# Patient Record
Sex: FEMALE | Race: WHITE | NOT HISPANIC OR LATINO | URBAN - METROPOLITAN AREA
[De-identification: names, ages, dates, MRNs, and addresses within clinical notes are randomized per-mention and may not be internally consistent; named-entity substitution may affect disease eponyms.]

---

## 2017-12-03 ENCOUNTER — EMERGENCY (EMERGENCY)
Facility: HOSPITAL | Age: 67
LOS: 1 days | Discharge: HOME | End: 2017-12-03
Attending: EMERGENCY MEDICINE | Admitting: EMERGENCY MEDICINE
Payer: MEDICARE

## 2017-12-03 VITALS
HEIGHT: 63 IN | RESPIRATION RATE: 20 BRPM | TEMPERATURE: 98 F | OXYGEN SATURATION: 99 % | DIASTOLIC BLOOD PRESSURE: 92 MMHG | SYSTOLIC BLOOD PRESSURE: 149 MMHG | HEART RATE: 90 BPM | WEIGHT: 190.04 LBS

## 2017-12-03 LAB
ALBUMIN SERPL ELPH-MCNC: 4.2 G/DL — SIGNIFICANT CHANGE UP (ref 3.3–5.2)
ALP SERPL-CCNC: 58 U/L — SIGNIFICANT CHANGE UP (ref 40–120)
ALT FLD-CCNC: 21 U/L — SIGNIFICANT CHANGE UP
ANION GAP SERPL CALC-SCNC: 18 MMOL/L — HIGH (ref 5–17)
AST SERPL-CCNC: 18 U/L — SIGNIFICANT CHANGE UP
BASOPHILS # BLD AUTO: 0 K/UL — SIGNIFICANT CHANGE UP (ref 0–0.2)
BASOPHILS NFR BLD AUTO: 0.1 % — SIGNIFICANT CHANGE UP (ref 0–2)
BILIRUB SERPL-MCNC: 0.3 MG/DL — LOW (ref 0.4–2)
BUN SERPL-MCNC: 14 MG/DL — SIGNIFICANT CHANGE UP (ref 8–20)
CALCIUM SERPL-MCNC: 9.1 MG/DL — SIGNIFICANT CHANGE UP (ref 8.6–10.2)
CHLORIDE SERPL-SCNC: 101 MMOL/L — SIGNIFICANT CHANGE UP (ref 98–107)
CO2 SERPL-SCNC: 22 MMOL/L — SIGNIFICANT CHANGE UP (ref 22–29)
CREAT SERPL-MCNC: 0.79 MG/DL — SIGNIFICANT CHANGE UP (ref 0.5–1.3)
EOSINOPHIL # BLD AUTO: 0 K/UL — SIGNIFICANT CHANGE UP (ref 0–0.5)
EOSINOPHIL NFR BLD AUTO: 0 % — SIGNIFICANT CHANGE UP (ref 0–6)
GLUCOSE SERPL-MCNC: 135 MG/DL — HIGH (ref 70–115)
HCT VFR BLD CALC: 40.7 % — SIGNIFICANT CHANGE UP (ref 37–47)
HGB BLD-MCNC: 13.7 G/DL — SIGNIFICANT CHANGE UP (ref 12–16)
LYMPHOCYTES # BLD AUTO: 0.8 K/UL — LOW (ref 1–4.8)
LYMPHOCYTES # BLD AUTO: 10.4 % — LOW (ref 20–55)
MAGNESIUM SERPL-MCNC: 2.4 MG/DL — SIGNIFICANT CHANGE UP (ref 1.6–2.6)
MCHC RBC-ENTMCNC: 29.1 PG — SIGNIFICANT CHANGE UP (ref 27–31)
MCHC RBC-ENTMCNC: 33.7 G/DL — SIGNIFICANT CHANGE UP (ref 32–36)
MCV RBC AUTO: 86.6 FL — SIGNIFICANT CHANGE UP (ref 81–99)
MONOCYTES # BLD AUTO: 0.3 K/UL — SIGNIFICANT CHANGE UP (ref 0–0.8)
MONOCYTES NFR BLD AUTO: 4.7 % — SIGNIFICANT CHANGE UP (ref 3–10)
NEUTROPHILS # BLD AUTO: 6.1 K/UL — SIGNIFICANT CHANGE UP (ref 1.8–8)
NEUTROPHILS NFR BLD AUTO: 84 % — HIGH (ref 37–73)
PLATELET # BLD AUTO: 221 K/UL — SIGNIFICANT CHANGE UP (ref 150–400)
POTASSIUM SERPL-MCNC: 4.8 MMOL/L — SIGNIFICANT CHANGE UP (ref 3.5–5.3)
POTASSIUM SERPL-SCNC: 4.8 MMOL/L — SIGNIFICANT CHANGE UP (ref 3.5–5.3)
PROT SERPL-MCNC: 7.4 G/DL — SIGNIFICANT CHANGE UP (ref 6.6–8.7)
RBC # BLD: 4.7 M/UL — SIGNIFICANT CHANGE UP (ref 4.4–5.2)
RBC # FLD: 14.1 % — SIGNIFICANT CHANGE UP (ref 11–15.6)
SODIUM SERPL-SCNC: 141 MMOL/L — SIGNIFICANT CHANGE UP (ref 135–145)
T4 AB SER-ACNC: 6 UG/DL — SIGNIFICANT CHANGE UP (ref 4.5–12)
TROPONIN T SERPL-MCNC: <0.01 NG/ML — SIGNIFICANT CHANGE UP (ref 0–0.06)
TSH SERPL-MCNC: 0.7 UIU/ML — SIGNIFICANT CHANGE UP (ref 0.27–4.2)
WBC # BLD: 7.2 K/UL — SIGNIFICANT CHANGE UP (ref 4.8–10.8)
WBC # FLD AUTO: 7.2 K/UL — SIGNIFICANT CHANGE UP (ref 4.8–10.8)

## 2017-12-03 PROCEDURE — 36415 COLL VENOUS BLD VENIPUNCTURE: CPT

## 2017-12-03 PROCEDURE — 80053 COMPREHEN METABOLIC PANEL: CPT

## 2017-12-03 PROCEDURE — 70450 CT HEAD/BRAIN W/O DYE: CPT

## 2017-12-03 PROCEDURE — 83735 ASSAY OF MAGNESIUM: CPT

## 2017-12-03 PROCEDURE — 99284 EMERGENCY DEPT VISIT MOD MDM: CPT | Mod: 25

## 2017-12-03 PROCEDURE — 99285 EMERGENCY DEPT VISIT HI MDM: CPT

## 2017-12-03 PROCEDURE — 84484 ASSAY OF TROPONIN QUANT: CPT

## 2017-12-03 PROCEDURE — 85027 COMPLETE CBC AUTOMATED: CPT

## 2017-12-03 PROCEDURE — 70486 CT MAXILLOFACIAL W/O DYE: CPT | Mod: 26

## 2017-12-03 PROCEDURE — 70450 CT HEAD/BRAIN W/O DYE: CPT | Mod: 26

## 2017-12-03 PROCEDURE — 84443 ASSAY THYROID STIM HORMONE: CPT

## 2017-12-03 PROCEDURE — 70486 CT MAXILLOFACIAL W/O DYE: CPT

## 2017-12-03 PROCEDURE — 84436 ASSAY OF TOTAL THYROXINE: CPT

## 2017-12-03 RX ORDER — MECLIZINE HCL 12.5 MG
50 TABLET ORAL ONCE
Qty: 0 | Refills: 0 | Status: DISCONTINUED | OUTPATIENT
Start: 2017-12-03 | End: 2017-12-07

## 2017-12-03 RX ORDER — DIAZEPAM 5 MG
1 TABLET ORAL
Qty: 20 | Refills: 0
Start: 2017-12-03 | End: 2017-12-08

## 2017-12-03 RX ORDER — DIAZEPAM 5 MG
5 TABLET ORAL ONCE
Qty: 0 | Refills: 0 | Status: DISCONTINUED | OUTPATIENT
Start: 2017-12-03 | End: 2017-12-03

## 2017-12-03 RX ORDER — SODIUM CHLORIDE 9 MG/ML
2000 INJECTION INTRAMUSCULAR; INTRAVENOUS; SUBCUTANEOUS ONCE
Qty: 0 | Refills: 0 | Status: COMPLETED | OUTPATIENT
Start: 2017-12-03 | End: 2017-12-03

## 2017-12-03 RX ADMIN — SODIUM CHLORIDE 2000 MILLILITER(S): 9 INJECTION INTRAMUSCULAR; INTRAVENOUS; SUBCUTANEOUS at 20:18

## 2017-12-03 RX ADMIN — Medication 5 MILLIGRAM(S): at 20:18

## 2017-12-03 NOTE — ED ADULT TRIAGE NOTE - CHIEF COMPLAINT QUOTE
Patient BIBA, A/Ox3 states she feels dizzy and shaky, symptoms started 1 hour ago, patient being treated for sinus infection, recently started prednisone.  Patient reports elevated blood pressure at home 186/115.

## 2017-12-03 NOTE — ED ADULT NURSE NOTE - OBJECTIVE STATEMENT
Assumed pt care @ 1940. Pt received sitting on stretcher in NAD. Pt AOx3 C/O having dizziness and feeling shakey. Pt reports High BP @ home but has no h/o HTN. Pt reports being on Prednisone, Flonase and Levaquin x 10 days but has sinus symptoms x 1 months. Coughing up green phlegm. Pt reports Blurred vision x 2 days in the Let eye. Pt has poor vision in the left eye and wears a contact for that eye but hasn't in 2 days due to the blurred vision and is wearing her glasses instead. Lungs CTA, RR even unlabored. Denies Fever, Chills, SOB, Chest Pain, Nausea, Vomiting, Diarrhea. Skin warm, dry, color appropriate for age and race.

## 2017-12-03 NOTE — ED PROVIDER NOTE - CARE PLAN
Principal Discharge DX:	Dizziness  Secondary Diagnosis:	Viral URI  Secondary Diagnosis:	Palpitations

## 2017-12-03 NOTE — ED PROVIDER NOTE - OBJECTIVE STATEMENT
66 y/o female presents to the ED with c/o dizziness, onset 2 weeks ago. Pt reports congestion, nasal drip, non productive cough. Pt states she was seen by Urgent Care twice, given Medrol pack. Per pt second visit to Urgent Care she was given Levaquin for sinus infection and Prednisone, pt last dose was today. Pt states she took BP at home and noted to be 180/115 at home during dizziness episode. Pt presents to the ED today for evaluation of dizziness, palpitations, described as heart racing, blurry vision, and "feeling like she is going to pass out." Pt states symptoms are worse with walking. Per pt , pt appears pale. Pt also states she was evaluated by PMD on 11/19 for palpitations. Pt denies fever, chills, SOB and any other acute symptoms and complaints at this time.   PMD: Dr. Gee

## 2017-12-03 NOTE — ED ADULT NURSE REASSESSMENT NOTE - NS ED NURSE REASSESS COMMENT FT1
Pt does not want to take Meclizine right now, Dr. Campoverde made aware. Pt will see how she does with Valium. RN will reevaluate pts dizziness.

## 2017-12-03 NOTE — ED PROVIDER NOTE - MEDICAL DECISION MAKING DETAILS
Will treat with normal saline, Valium, Meclozine, obtain labs including 1 set cardiac enzymes, TSH, head CT, ECG, and reevaluate.

## 2019-06-05 ENCOUNTER — EMERGENCY (EMERGENCY)
Facility: HOSPITAL | Age: 69
LOS: 1 days | Discharge: HOME | End: 2019-06-05
Attending: EMERGENCY MEDICINE
Payer: MEDICARE

## 2019-06-05 VITALS
SYSTOLIC BLOOD PRESSURE: 183 MMHG | HEIGHT: 63 IN | RESPIRATION RATE: 18 BRPM | HEART RATE: 75 BPM | TEMPERATURE: 98 F | OXYGEN SATURATION: 97 % | WEIGHT: 184.97 LBS | DIASTOLIC BLOOD PRESSURE: 83 MMHG

## 2019-06-05 VITALS
RESPIRATION RATE: 20 BRPM | DIASTOLIC BLOOD PRESSURE: 86 MMHG | SYSTOLIC BLOOD PRESSURE: 147 MMHG | TEMPERATURE: 98 F | HEART RATE: 70 BPM | OXYGEN SATURATION: 96 %

## 2019-06-05 DIAGNOSIS — Z96.641 PRESENCE OF RIGHT ARTIFICIAL HIP JOINT: Chronic | ICD-10-CM

## 2019-06-05 LAB
ALBUMIN SERPL ELPH-MCNC: 4.3 G/DL — SIGNIFICANT CHANGE UP (ref 3.3–5.2)
ALP SERPL-CCNC: 59 U/L — SIGNIFICANT CHANGE UP (ref 40–120)
ALT FLD-CCNC: 18 U/L — SIGNIFICANT CHANGE UP
ANION GAP SERPL CALC-SCNC: 13 MMOL/L — SIGNIFICANT CHANGE UP (ref 5–17)
AST SERPL-CCNC: 18 U/L — SIGNIFICANT CHANGE UP
BILIRUB SERPL-MCNC: 0.7 MG/DL — SIGNIFICANT CHANGE UP (ref 0.4–2)
BUN SERPL-MCNC: 9 MG/DL — SIGNIFICANT CHANGE UP (ref 8–20)
CALCIUM SERPL-MCNC: 9.6 MG/DL — SIGNIFICANT CHANGE UP (ref 8.6–10.2)
CHLORIDE SERPL-SCNC: 106 MMOL/L — SIGNIFICANT CHANGE UP (ref 98–107)
CO2 SERPL-SCNC: 25 MMOL/L — SIGNIFICANT CHANGE UP (ref 22–29)
CREAT SERPL-MCNC: 0.6 MG/DL — SIGNIFICANT CHANGE UP (ref 0.5–1.3)
GLUCOSE SERPL-MCNC: 103 MG/DL — SIGNIFICANT CHANGE UP (ref 70–115)
HCT VFR BLD CALC: 39.2 % — SIGNIFICANT CHANGE UP (ref 37–47)
HGB BLD-MCNC: 13 G/DL — SIGNIFICANT CHANGE UP (ref 12–16)
INR BLD: 1.03 RATIO — SIGNIFICANT CHANGE UP (ref 0.88–1.16)
MCHC RBC-ENTMCNC: 28.6 PG — SIGNIFICANT CHANGE UP (ref 27–31)
MCHC RBC-ENTMCNC: 33.2 G/DL — SIGNIFICANT CHANGE UP (ref 32–36)
MCV RBC AUTO: 86.2 FL — SIGNIFICANT CHANGE UP (ref 81–99)
PLATELET # BLD AUTO: 186 K/UL — SIGNIFICANT CHANGE UP (ref 150–400)
POTASSIUM SERPL-MCNC: 4.8 MMOL/L — SIGNIFICANT CHANGE UP (ref 3.5–5.3)
POTASSIUM SERPL-SCNC: 4.8 MMOL/L — SIGNIFICANT CHANGE UP (ref 3.5–5.3)
PROT SERPL-MCNC: 7.3 G/DL — SIGNIFICANT CHANGE UP (ref 6.6–8.7)
PROTHROM AB SERPL-ACNC: 11.9 SEC — SIGNIFICANT CHANGE UP (ref 10–12.9)
RBC # BLD: 4.55 M/UL — SIGNIFICANT CHANGE UP (ref 4.4–5.2)
RBC # FLD: 14.2 % — SIGNIFICANT CHANGE UP (ref 11–15.6)
SODIUM SERPL-SCNC: 144 MMOL/L — SIGNIFICANT CHANGE UP (ref 135–145)
T3 SERPL-MCNC: 115 NG/DL — SIGNIFICANT CHANGE UP (ref 80–200)
TROPONIN T SERPL-MCNC: <0.01 NG/ML — SIGNIFICANT CHANGE UP (ref 0–0.06)
TROPONIN T SERPL-MCNC: <0.01 NG/ML — SIGNIFICANT CHANGE UP (ref 0–0.06)
WBC # BLD: 5.4 K/UL — SIGNIFICANT CHANGE UP (ref 4.8–10.8)
WBC # FLD AUTO: 5.4 K/UL — SIGNIFICANT CHANGE UP (ref 4.8–10.8)

## 2019-06-05 PROCEDURE — 93005 ELECTROCARDIOGRAM TRACING: CPT

## 2019-06-05 PROCEDURE — 36415 COLL VENOUS BLD VENIPUNCTURE: CPT

## 2019-06-05 PROCEDURE — 85610 PROTHROMBIN TIME: CPT

## 2019-06-05 PROCEDURE — 71045 X-RAY EXAM CHEST 1 VIEW: CPT | Mod: 26

## 2019-06-05 PROCEDURE — 93010 ELECTROCARDIOGRAM REPORT: CPT

## 2019-06-05 PROCEDURE — 71045 X-RAY EXAM CHEST 1 VIEW: CPT

## 2019-06-05 PROCEDURE — 99284 EMERGENCY DEPT VISIT MOD MDM: CPT | Mod: 25

## 2019-06-05 PROCEDURE — 80053 COMPREHEN METABOLIC PANEL: CPT

## 2019-06-05 PROCEDURE — 84484 ASSAY OF TROPONIN QUANT: CPT

## 2019-06-05 PROCEDURE — 84480 ASSAY TRIIODOTHYRONINE (T3): CPT

## 2019-06-05 PROCEDURE — 99285 EMERGENCY DEPT VISIT HI MDM: CPT

## 2019-06-05 PROCEDURE — 84436 ASSAY OF TOTAL THYROXINE: CPT

## 2019-06-05 PROCEDURE — 85027 COMPLETE CBC AUTOMATED: CPT

## 2019-06-05 PROCEDURE — 84443 ASSAY THYROID STIM HORMONE: CPT

## 2019-06-05 RX ORDER — CEPHALEXIN 500 MG
1 CAPSULE ORAL
Qty: 40 | Refills: 0
Start: 2019-06-05 | End: 2019-06-14

## 2019-06-05 RX ORDER — ASPIRIN/CALCIUM CARB/MAGNESIUM 324 MG
324 TABLET ORAL ONCE
Refills: 0 | Status: COMPLETED | OUTPATIENT
Start: 2019-06-05 | End: 2019-06-05

## 2019-06-05 RX ORDER — TETANUS TOXOID, REDUCED DIPHTHERIA TOXOID AND ACELLULAR PERTUSSIS VACCINE, ADSORBED 5; 2.5; 8; 8; 2.5 [IU]/.5ML; [IU]/.5ML; UG/.5ML; UG/.5ML; UG/.5ML
0.5 SUSPENSION INTRAMUSCULAR ONCE
Refills: 0 | Status: COMPLETED | OUTPATIENT
Start: 2019-06-05 | End: 2019-06-05

## 2019-06-05 RX ORDER — SODIUM CHLORIDE 9 MG/ML
3 INJECTION INTRAMUSCULAR; INTRAVENOUS; SUBCUTANEOUS ONCE
Refills: 0 | Status: COMPLETED | OUTPATIENT
Start: 2019-06-05 | End: 2019-06-05

## 2019-06-05 RX ORDER — CEPHALEXIN 500 MG
500 CAPSULE ORAL ONCE
Refills: 0 | Status: COMPLETED | OUTPATIENT
Start: 2019-06-05 | End: 2019-06-05

## 2019-06-05 RX ADMIN — Medication 500 MILLIGRAM(S): at 11:59

## 2019-06-05 RX ADMIN — SODIUM CHLORIDE 3 MILLILITER(S): 9 INJECTION INTRAMUSCULAR; INTRAVENOUS; SUBCUTANEOUS at 12:07

## 2019-06-05 RX ADMIN — Medication 324 MILLIGRAM(S): at 11:58

## 2019-06-05 NOTE — ED PROVIDER NOTE - MUSCULOSKELETAL, MLM
Spine appears normal, range of motion is not limited, no muscle or joint tenderness from to hips ankles kness

## 2019-06-05 NOTE — CONSULT NOTE ADULT - SUBJECTIVE AND OBJECTIVE BOX
[facility], THE [facility]                                   [address] [place] [address]                                                      PHONE: [phone]                                                         FAX: [phone]  [email or web address]  ---------------------------------------------------------------------------------------------------------------------------------    Reason for Consult: chest pain    HPI:  BROCK ANDRADE is an 68y Female with HTN, HLD, moderate aortic regurgitation presented this morning with redness and swelling on her right thigh after bee sting last week. She was concerned because she recently had right hip replacement and was concerned that she could get a hip infection. While in the emergency room, she also developed chest pressure across her entire chest that has been intermittent. She had negative PET myocardial perfusion scan in Jan 2018.     PAST MEDICAL & SURGICAL HISTORY:  No significant past surgical history      Tapazole (Rash)  Tapazole (Unknown)  tetracycline (Rash)  tetracyclines (Unknown)      MEDICATIONS  (STANDING):  aspirin  chewable 324 milliGRAM(s) Oral once  cephalexin 500 milliGRAM(s) Oral once  diphtheria/tetanus/pertussis (acellular) Vaccine (ADAcel) 0.5 milliLiter(s) IntraMuscular once  sodium chloride 0.9% lock flush 3 milliLiter(s) IV Push once    MEDICATIONS  (PRN):      Social History:  Cigarettes: denies                  Alcohol:                 Illicit Drug Abuse:      Family History:  no known history of CAD    ROS: Negative other than as mentioned in HPI.    Vital Signs Last 24 Hrs  T(C): 36.8 (05 Jun 2019 09:30), Max: 36.8 (05 Jun 2019 09:30)  T(F): 98.3 (05 Jun 2019 09:30), Max: 98.3 (05 Jun 2019 09:30)  HR: 75 (05 Jun 2019 09:30) (75 - 75)  BP: 183/83 (05 Jun 2019 09:30) (183/83 - 183/83)  BP(mean): --  RR: 18 (05 Jun 2019 09:30) (18 - 18)  SpO2: 97% (05 Jun 2019 09:30) (97% - 97%)  ICU Vital Signs Last 24 Hrs  BROCK ANDRADE  I&O's Detail    I&O's Summary    Drug Dosing Weight  BROCK ANDRADE      PHYSICAL EXAM:  General: Appears well developed, well nourished alert and cooperative.  HEENT: Head; normocephalic, atraumatic.  Eyes: Pupils reactive, cornea wnl.  Neck: Supple, no nodes adenopathy, no NVD or carotid bruit or thyromegaly.  CARDIOVASCULAR: Normal S1 and S2, No murmur, rub, gallop or lift.   LUNGS: No rales, rhonchi or wheeze. Normal breath sounds bilaterally.  ABDOMEN: Soft, nontender without mass or organomegaly. bowel sounds normoactive.  EXTREMITIES: No clubbing, cyanosis or edema. Distal pulses wnl.   SKIN: warm and dry with normal turgor.  NEURO: Alert/oriented x 3/normal motor exam. No pathologic reflexes.    PSYCH: normal affect.      RADIOLOGY & ADDITIONAL STUDIES:    INTERPRETATION OF TELEMETRY (personally reviewed): sinus rhythm    ECG: sinus rhythm 71 bpm, normal axis, normal intervals, no ischemic ST abnormality    ECHO Jan 2018: Normal LV function, mild to moderate aortic regurgitation    Cardiac PET Jan 2018: normal res-stress myocardial perfusion    Assessment and Plan:  In summary, BROCK ANDRADE is an 68y Female with past medical history significant for HTN, HLD, moderate aortic regurgitation presented this morning with redness and swelling on her right thigh after bee sting last week and atypical chest pain while in the emergency room.    Atypical Chest Pain -- EKG is not ischemic. Cardiac PET showed normal perfusion Jan 2018. Low suspicion for ACS at this time.  - give  mg once  - check troponin and repeat EKG -- if troponin negative x 2, patient can be discharged home and I will arrange for close follow up in our office.    Thank you for allowing [facility] to participate in the care of this patient. Please do not hesitate to call with any questions or concerns.

## 2019-06-05 NOTE — ED PROVIDER NOTE - OBJECTIVE STATEMENT
69 y/o F hx of HTN right hip replacement, pw rash to right thigh since last night.. Patient states she was stung by a bee twice last week, on right thigh. The stings are on the right thigh, one on the posterior aspect and one on the anterior aspect of the thigh. notes no redness initially but became red yesterday and was very itchy.  pt notes that felt nervous at night bc of it and developed some midsternal chest pressure around 3am. non radiating no numbness/tingling no sob + mild palpitations. Denies f/c/n/v//sob/cough/rash/headache/dizziness/abd.pain/d/c/dysuria/hematuria. no sick contacs no recent travel no hx of dvt/pe

## 2019-06-05 NOTE — ED STATDOCS - PROGRESS NOTE DETAILS
At the end of patient encounter, patient requests BP recheck, because it was slightly elevated when she came in. She states it is most likely from anxiety, but is complaining of the chest pressure, 3/10, with hx of HTN. WIll now be transferred to [facility] for full evaluation by another provider.

## 2019-06-05 NOTE — ED ADULT NURSE NOTE - OBJECTIVE STATEMENT
Pt care assumed at 1137, in no apparent distress at this time. pt A&Ox3, resting in stretcher with  at bedside. pt reports being stung by a bee twice on the right thigh. Pt care assumed at 1137, in no apparent distress at this time. pt A&Ox3, resting in stretcher with  at bedside. pt reports being stung by a bee twice on the right thigh last week. Pt c/o right thigh itchiness and discomfort that woke her up during the night. Redness and swelling noted Pt care assumed at 1137, in no apparent distress at this time. Pt A&Ox3, resting in stretcher with  at bedside. Pt reports being stung by a bee twice on the right thigh last week. Pt c/o right thigh itchiness and discomfort that woke her up during the night. Redness and swelling noted on right thigh. Erythematous area outlined. Pt reports she had chest pain and palpitations at 3am this morning. Pt denies any chest pain, palpitations or shortness of breath at this time. Pt expresses concern because she had a total right hip replacement in December. HR is NSR on cardiac monitor. Pt educated on plan of care, plan of care taught back to RN. Proficiency determined from successful pt teach back. will continue to educate pt throughout ED stay.

## 2019-06-05 NOTE — ED STATDOCS - CLINICAL SUMMARY MEDICAL DECISION MAKING FREE TEXT BOX
Patient here for cellulitis, subsequently c/o chest pressure, 3/10 across her chest, with no associated symptoms. Hx of HTN. Contacted [facility]. Send to main room for labs, chest xr , full eval.   EKG: does no show acute changes.

## 2019-06-05 NOTE — ED PROVIDER NOTE - CLINICAL SUMMARY MEDICAL DECISION MAKING FREE TEXT BOX
cellulitis to le will fu labs tx with abx  cp most likely related to anxiety [facility] cardiology evaluated pt will do 2 trops ekg--dc with outpatient follow up with trops neg

## 2019-06-05 NOTE — ED STATDOCS - SKIN, MLM
skin normal color for race, warm, dry and intact. 5cm area of cellulitis, distal right lower extremity. No vesicles or drainage. Erythematous, warm, FROM of leg.

## 2019-06-09 ENCOUNTER — EMERGENCY (EMERGENCY)
Facility: HOSPITAL | Age: 69
LOS: 1 days | Discharge: HOME | End: 2019-06-09
Attending: EMERGENCY MEDICINE
Payer: MEDICARE

## 2019-06-09 VITALS
RESPIRATION RATE: 16 BRPM | OXYGEN SATURATION: 97 % | HEART RATE: 63 BPM | TEMPERATURE: 98 F | DIASTOLIC BLOOD PRESSURE: 83 MMHG | SYSTOLIC BLOOD PRESSURE: 144 MMHG

## 2019-06-09 VITALS — HEIGHT: 63 IN | WEIGHT: 184.97 LBS

## 2019-06-09 DIAGNOSIS — Z96.641 PRESENCE OF RIGHT ARTIFICIAL HIP JOINT: Chronic | ICD-10-CM

## 2019-06-09 PROBLEM — I10 ESSENTIAL (PRIMARY) HYPERTENSION: Chronic | Status: ACTIVE | Noted: 2019-06-05

## 2019-06-09 PROBLEM — E78.5 HYPERLIPIDEMIA, UNSPECIFIED: Chronic | Status: ACTIVE | Noted: 2019-06-05

## 2019-06-09 LAB
ALBUMIN SERPL ELPH-MCNC: 4.2 G/DL — SIGNIFICANT CHANGE UP (ref 3.3–5.2)
ALP SERPL-CCNC: 60 U/L — SIGNIFICANT CHANGE UP (ref 40–120)
ALT FLD-CCNC: 16 U/L — SIGNIFICANT CHANGE UP
ANION GAP SERPL CALC-SCNC: 13 MMOL/L — SIGNIFICANT CHANGE UP (ref 5–17)
AST SERPL-CCNC: 16 U/L — SIGNIFICANT CHANGE UP
BASOPHILS # BLD AUTO: 0 K/UL — SIGNIFICANT CHANGE UP (ref 0–0.2)
BASOPHILS NFR BLD AUTO: 0.3 % — SIGNIFICANT CHANGE UP (ref 0–2)
BILIRUB SERPL-MCNC: 0.7 MG/DL — SIGNIFICANT CHANGE UP (ref 0.4–2)
BUN SERPL-MCNC: 12 MG/DL — SIGNIFICANT CHANGE UP (ref 8–20)
CALCIUM SERPL-MCNC: 9.6 MG/DL — SIGNIFICANT CHANGE UP (ref 8.6–10.2)
CHLORIDE SERPL-SCNC: 102 MMOL/L — SIGNIFICANT CHANGE UP (ref 98–107)
CO2 SERPL-SCNC: 24 MMOL/L — SIGNIFICANT CHANGE UP (ref 22–29)
CREAT SERPL-MCNC: 0.82 MG/DL — SIGNIFICANT CHANGE UP (ref 0.5–1.3)
CRP SERPL-MCNC: <0.4 MG/DL — SIGNIFICANT CHANGE UP (ref 0–0.4)
EOSINOPHIL # BLD AUTO: 0.2 K/UL — SIGNIFICANT CHANGE UP (ref 0–0.5)
EOSINOPHIL NFR BLD AUTO: 3.5 % — SIGNIFICANT CHANGE UP (ref 0–6)
ERYTHROCYTE [SEDIMENTATION RATE] IN BLOOD: 26 MM/HR — HIGH (ref 0–20)
GLUCOSE SERPL-MCNC: 111 MG/DL — SIGNIFICANT CHANGE UP (ref 70–115)
HCT VFR BLD CALC: 36.6 % — LOW (ref 37–47)
HGB BLD-MCNC: 12.3 G/DL — SIGNIFICANT CHANGE UP (ref 12–16)
LACTATE BLDV-MCNC: 1.9 MMOL/L — SIGNIFICANT CHANGE UP (ref 0.5–2)
LYMPHOCYTES # BLD AUTO: 1.7 K/UL — SIGNIFICANT CHANGE UP (ref 1–4.8)
LYMPHOCYTES # BLD AUTO: 25.1 % — SIGNIFICANT CHANGE UP (ref 20–55)
MCHC RBC-ENTMCNC: 28.6 PG — SIGNIFICANT CHANGE UP (ref 27–31)
MCHC RBC-ENTMCNC: 33.6 G/DL — SIGNIFICANT CHANGE UP (ref 32–36)
MCV RBC AUTO: 85.1 FL — SIGNIFICANT CHANGE UP (ref 81–99)
MONOCYTES # BLD AUTO: 0.8 K/UL — SIGNIFICANT CHANGE UP (ref 0–0.8)
MONOCYTES NFR BLD AUTO: 11.7 % — HIGH (ref 3–10)
NEUTROPHILS # BLD AUTO: 4 K/UL — SIGNIFICANT CHANGE UP (ref 1.8–8)
NEUTROPHILS NFR BLD AUTO: 59.3 % — SIGNIFICANT CHANGE UP (ref 37–73)
PLATELET # BLD AUTO: 171 K/UL — SIGNIFICANT CHANGE UP (ref 150–400)
POTASSIUM SERPL-MCNC: 4.3 MMOL/L — SIGNIFICANT CHANGE UP (ref 3.5–5.3)
POTASSIUM SERPL-SCNC: 4.3 MMOL/L — SIGNIFICANT CHANGE UP (ref 3.5–5.3)
PROT SERPL-MCNC: 7 G/DL — SIGNIFICANT CHANGE UP (ref 6.6–8.7)
RBC # BLD: 4.3 M/UL — LOW (ref 4.4–5.2)
RBC # FLD: 14.2 % — SIGNIFICANT CHANGE UP (ref 11–15.6)
SODIUM SERPL-SCNC: 139 MMOL/L — SIGNIFICANT CHANGE UP (ref 135–145)
WBC # BLD: 6.8 K/UL — SIGNIFICANT CHANGE UP (ref 4.8–10.8)
WBC # FLD AUTO: 6.8 K/UL — SIGNIFICANT CHANGE UP (ref 4.8–10.8)

## 2019-06-09 PROCEDURE — 99234 HOSP IP/OBS SM DT SF/LOW 45: CPT

## 2019-06-09 PROCEDURE — 80053 COMPREHEN METABOLIC PANEL: CPT

## 2019-06-09 PROCEDURE — 86753 PROTOZOA ANTIBODY NOS: CPT

## 2019-06-09 PROCEDURE — 83605 ASSAY OF LACTIC ACID: CPT

## 2019-06-09 PROCEDURE — 96375 TX/PRO/DX INJ NEW DRUG ADDON: CPT

## 2019-06-09 PROCEDURE — G0378: CPT

## 2019-06-09 PROCEDURE — 99284 EMERGENCY DEPT VISIT MOD MDM: CPT | Mod: 25

## 2019-06-09 PROCEDURE — 96365 THER/PROPH/DIAG IV INF INIT: CPT

## 2019-06-09 PROCEDURE — 96366 THER/PROPH/DIAG IV INF ADDON: CPT

## 2019-06-09 PROCEDURE — 86140 C-REACTIVE PROTEIN: CPT

## 2019-06-09 PROCEDURE — 86666 EHRLICHIA ANTIBODY: CPT

## 2019-06-09 PROCEDURE — 96361 HYDRATE IV INFUSION ADD-ON: CPT

## 2019-06-09 PROCEDURE — 85652 RBC SED RATE AUTOMATED: CPT

## 2019-06-09 PROCEDURE — 36415 COLL VENOUS BLD VENIPUNCTURE: CPT

## 2019-06-09 PROCEDURE — 85027 COMPLETE CBC AUTOMATED: CPT

## 2019-06-09 PROCEDURE — 86757 RICKETTSIA ANTIBODY: CPT

## 2019-06-09 RX ORDER — DIPHENHYDRAMINE HCL 50 MG
50 CAPSULE ORAL EVERY 8 HOURS
Refills: 0 | Status: DISCONTINUED | OUTPATIENT
Start: 2019-06-09 | End: 2019-06-14

## 2019-06-09 RX ORDER — DIPHENHYDRAMINE HCL 50 MG
50 CAPSULE ORAL ONCE
Refills: 0 | Status: COMPLETED | OUTPATIENT
Start: 2019-06-09 | End: 2019-06-09

## 2019-06-09 RX ORDER — VALSARTAN 80 MG/1
80 TABLET ORAL AT BEDTIME
Refills: 0 | Status: DISCONTINUED | OUTPATIENT
Start: 2019-06-09 | End: 2019-06-14

## 2019-06-09 RX ORDER — ACETAMINOPHEN 500 MG
650 TABLET ORAL EVERY 6 HOURS
Refills: 0 | Status: DISCONTINUED | OUTPATIENT
Start: 2019-06-09 | End: 2019-06-14

## 2019-06-09 RX ORDER — CLOTRIMAZOLE AND BETAMETHASONE DIPROPIONATE 10; .5 MG/G; MG/G
1 CREAM TOPICAL
Refills: 0 | Status: DISCONTINUED | OUTPATIENT
Start: 2019-06-09 | End: 2019-06-14

## 2019-06-09 RX ORDER — ATORVASTATIN CALCIUM 80 MG/1
10 TABLET, FILM COATED ORAL AT BEDTIME
Refills: 0 | Status: DISCONTINUED | OUTPATIENT
Start: 2019-06-09 | End: 2019-06-14

## 2019-06-09 RX ORDER — SODIUM CHLORIDE 9 MG/ML
1000 INJECTION INTRAMUSCULAR; INTRAVENOUS; SUBCUTANEOUS ONCE
Refills: 0 | Status: COMPLETED | OUTPATIENT
Start: 2019-06-09 | End: 2019-06-09

## 2019-06-09 RX ADMIN — Medication 100 MILLIGRAM(S): at 21:46

## 2019-06-09 RX ADMIN — CLOTRIMAZOLE AND BETAMETHASONE DIPROPIONATE 1 APPLICATION(S): 10; .5 CREAM TOPICAL at 18:55

## 2019-06-09 RX ADMIN — Medication 100 MILLIGRAM(S): at 06:41

## 2019-06-09 RX ADMIN — SODIUM CHLORIDE 1000 MILLILITER(S): 9 INJECTION INTRAMUSCULAR; INTRAVENOUS; SUBCUTANEOUS at 07:58

## 2019-06-09 RX ADMIN — Medication 650 MILLIGRAM(S): at 13:38

## 2019-06-09 RX ADMIN — Medication 125 MILLIGRAM(S): at 07:01

## 2019-06-09 RX ADMIN — Medication 650 MILLIGRAM(S): at 14:55

## 2019-06-09 RX ADMIN — SODIUM CHLORIDE 1000 MILLILITER(S): 9 INJECTION INTRAMUSCULAR; INTRAVENOUS; SUBCUTANEOUS at 06:41

## 2019-06-09 RX ADMIN — Medication 50 MILLIGRAM(S): at 07:02

## 2019-06-09 RX ADMIN — Medication 100 MILLIGRAM(S): at 13:38

## 2019-06-09 RX ADMIN — Medication 600 MILLIGRAM(S): at 22:18

## 2019-06-09 RX ADMIN — Medication 300 MILLIGRAM(S): at 22:17

## 2019-06-09 RX ADMIN — Medication 600 MILLIGRAM(S): at 07:18

## 2019-06-09 RX ADMIN — Medication 600 MILLIGRAM(S): at 14:56

## 2019-06-09 RX ADMIN — ATORVASTATIN CALCIUM 10 MILLIGRAM(S): 80 TABLET, FILM COATED ORAL at 21:46

## 2019-06-09 NOTE — ED CDU PROVIDER INITIAL DAY NOTE - CROS ED NEURO ALL NEG
negative... Range of motion is not limited, no muscle or joint tenderness. Small hematoma L bicep, no bony deformity.

## 2019-06-09 NOTE — ED CDU PROVIDER INITIAL DAY NOTE - PROGRESS NOTE DETAILS
PT resting comfortably during morning rounds. Examined with Dr. Mcleod. There are two distinct circular areas with central clearing to the right upper leg. No increased warmth to touch, no drainage, no induration, no fluctuance. Clinically does not appear to be cellulitis. will send tick born panel due to central clearing on rash. Will also apply Lotrisone cream to area for possible allergic dermatitis vs fungal.  Will continue abx upon discharge as per primary team.

## 2019-06-09 NOTE — ED CDU PROVIDER DISPOSITION NOTE - ATTENDING CONTRIBUTION TO CARE
I agree with the PA's note and was available for any issues/concerns. I was directly involved in patient care. My brief overall assessment is as follows: symptoms improved pt feeling well, will d/c with clindamcyin and topical clotrimazole. no signs systemic infection.

## 2019-06-09 NOTE — ED ADULT NURSE REASSESSMENT NOTE - NS ED NURSE REASSESS COMMENT FT1
Assumed pt care at this time. Pt A & OX4, respirations are even & unlabored. Pt Assumed pt care at this time. Pt A & OX4, respirations are even & unlabored. Pt offers no complaints at this time. states she is tired from the benadryl and requests to be left alone to sleep. Pt aware of plan of care and is on observation.

## 2019-06-09 NOTE — ED ADULT NURSE REASSESSMENT NOTE - NS ED NURSE REASSESS COMMENT FT1
Dc instructions provided. educated pt with all written instructions. pt verbalizes understanding. pt with no questions or concerns. VSS and documented as per flow sheet. pt ambulated out ED with steady gait. ED registration made aware of pt's dispo status.

## 2019-06-09 NOTE — ED PROVIDER NOTE - CLINICAL SUMMARY MEDICAL DECISION MAKING FREE TEXT BOX
pt was being treat with keflex, will switch to clinda and check labs, hdyrate, treat with benadryl and solumedrol as well

## 2019-06-09 NOTE — ED PROVIDER NOTE - CAPILLARY REFILL
less than 2 seconds/2 isolated bee bites on rt lower thigh, one on ant thigh wtith spreadign werythema but no streaking, another bite on post rt thgih aslso spreading out marked area and mildy warm, but no creptius, no lad

## 2019-06-09 NOTE — ED CDU PROVIDER INITIAL DAY NOTE - PHYSICAL EXAMINATION
Vital signs noted, see flowsheet.  General: Well nourished/developed. In no acute distress, well appearing and non-toxic.  HEENT: Normocephalic/atraumatic.  Moist mucous membranes. Conjunctiva and sclera clear b/l.  EOMI. PERRL. No nasal discharge, throat clear, no tonsilar/uvula swelling. No drooling.  Neck: Soft and supple, full ROM without pain.  Cardiac: Regular rate and rhythm. +S1/S2. Peripheral pulses 2+ and symmetric b/l.   Respiratory: Speaking in full sentences, no evidence of respiratory distress. Lungs clear to ascultation b/l, no wheezes/rhonchi/rales/stridor.   Abdomen: Soft, non-tender, non-distended. No guarding or rebound tenderness. No suprapubic tenderness.  Back: Spine midline and non-tender. No CVAT.  Skin: Normal color for race, no evidence of ecchymosis, cyanosis or jaundice. Capillary refill less than 2 seconds. 2 isolated bee bites on right lower thigh, one on anterior thigh with spreading erythema but no streaking, another bite on posterior right thigh also spreading out marked area, mildly increased warmth.    Lymph: No cervical lymphadenopathy  Neuro: Awake, alert and oriented to person/place/time/situation. Moves all extremities spontaneously and symmetrically.  No focal deficits

## 2019-06-09 NOTE — ED CDU PROVIDER DISPOSITION NOTE - CLINICAL COURSE
69 y/o F with HLD, HTN presents to ED c/o right thigh bee bites with increased itching and redness spreading beyond marked areas. Pt treated with IV clindamycin and topical clotrimazole and had improvement of symptoms. Will d/c with course of PO clinda and topical clotrimazole.

## 2019-06-09 NOTE — ED ADULT NURSE REASSESSMENT NOTE - NS ED NURSE REASSESS COMMENT FT1
pt a+ox3, resting comfortably in bed. per MD orders, meds administered. pt refused dose of Solu-Medrol, states "my swelling has gone down already since this morning, I have a headache and high BP as it is. I  don't want it." pt asking if possible to be d/c'd tonight after 10pm IV abx. informed pt will relay message to MD/PA, pt verbalizes understanding.

## 2019-06-09 NOTE — ED ADULT NURSE NOTE - OBJECTIVE STATEMENT
Patient presents s/p bee sting which now seems to be infected.  Patient has two stings one anterior thigh one posterior with area of erythema circled with pen.  patient is well appearing, denies chest pain/difficulty breathing, denies nasuea/vomiting/diarrhea.  Respirations are even and unlabored.

## 2019-06-09 NOTE — ED PROVIDER NOTE - OBJECTIVE STATEMENT
69 y/o F with h/o htn p/w rt thigh bee bites being more itchy and redness spreading beyond marked areas. Pt was bit by bees 1 wk ago, no recurrent bites, no fever, chills, difficulty walking.

## 2019-06-09 NOTE — ED CDU PROVIDER INITIAL DAY NOTE - OBJECTIVE STATEMENT
69 y/o F with HLD, HTN presents to ED c/o right thigh bee bites with increased itching and redness spreading beyond marked areas. Pt had bee sting to right leg on 5/29/19, no recurrent bites. Reports yesterday evening the site started to get more itchy and red with bumps. Unsure if allergic to bee stings, has never been stung before. Was seen at [facility] ED on Wedneday 6/5/19, was started on Keflex 4x/day 500mg (10 days course, has been taking as prescribed), reports improvement in itch. Has not been taking any other medications for area. Had right hip replacement in December 2018 ([facility]

## 2019-06-09 NOTE — ED ADULT NURSE REASSESSMENT NOTE - GENERAL PATIENT STATE
comfortable appearance
comfortable appearance
resting/sleeping/comfortable appearance/cooperative/family/SO at bedside/smiling/interactive

## 2019-06-09 NOTE — ED ADULT NURSE REASSESSMENT NOTE - NS ED NURSE REASSESS COMMENT FT1
received pt from previous Rn Miladys Leavitt. pt resting in stretcher, with c/o 2/10 headache. requesting no medication at this time. pt stated " I took Tylenol earlier. I am okay now."  talking with family at bedside. VSS and documented as per flow sheet.  awaiting IV abx @ 2200. plan of care reviewed. pt requesting " po medication prior to discharge for am medication dose." pt informed by LATASHA Sotomayor medication will be sent to pharmacy. pt un happy with answer, still requesting PO medication be given to her prior to discharge. requesting to speak with nurse in charge. ED charge nurse Eulalia Tate made aware and at bedside. bed in lowest position, call bell within reach and safety maintained.

## 2019-06-09 NOTE — ED CDU PROVIDER INITIAL DAY NOTE - ATTENDING CONTRIBUTION TO CARE
Jae RODRIGUEZ-67 y/o F with h/o htn p/w rt thigh bee bites being more itchy and redness spreading beyond marked areas. Pt was bit by bees 1 wk ago, no recurrent bites, no fever, chills, difficulty walking.    PT IS ALERT, well appearing, 2 isolated patches of redness, warmth over rt thigh, s1s2 normal reg, b/l clear breath sounds, abd soft, nt, nd, neuro exam aox3, cn 2-12 intact no focal deficits, skin warm, dry, good turgor    plan to check labs, continue clinda, benadryl and solumedrol

## 2019-06-11 LAB
A PHAGOCYTOPH IGG TITR SER IF: SIGNIFICANT CHANGE UP TITER
B BURGDOR AB SER QL IA: NEGATIVE — SIGNIFICANT CHANGE UP
B MICROTI IGG TITR SER: SIGNIFICANT CHANGE UP TITER
E CHAFFEENSIS IGG TITR SER IF: SIGNIFICANT CHANGE UP TITER

## 2019-06-14 LAB
R RICKETTSI AB SER-ACNC: NEGATIVE — SIGNIFICANT CHANGE UP
R RICKETTSI IGM SER-ACNC: 0.47 INDEX — SIGNIFICANT CHANGE UP (ref 0–0.89)
RICK SF IGG TITR SER IF: NEGATIVE — SIGNIFICANT CHANGE UP
RICK SF IGM TITR SER IF: 0.47 INDEX — SIGNIFICANT CHANGE UP (ref 0–0.89)

## 2020-06-25 ENCOUNTER — EMERGENCY (EMERGENCY)
Facility: HOSPITAL | Age: 70
LOS: 1 days | Discharge: HOME | End: 2020-06-25
Attending: EMERGENCY MEDICINE
Payer: MEDICARE

## 2020-06-25 VITALS
OXYGEN SATURATION: 97 % | DIASTOLIC BLOOD PRESSURE: 69 MMHG | SYSTOLIC BLOOD PRESSURE: 133 MMHG | RESPIRATION RATE: 18 BRPM | HEART RATE: 79 BPM

## 2020-06-25 VITALS
DIASTOLIC BLOOD PRESSURE: 73 MMHG | SYSTOLIC BLOOD PRESSURE: 113 MMHG | TEMPERATURE: 98 F | RESPIRATION RATE: 17 BRPM | OXYGEN SATURATION: 95 % | HEART RATE: 96 BPM | WEIGHT: 177.91 LBS | HEIGHT: 63 IN

## 2020-06-25 DIAGNOSIS — Z96.641 PRESENCE OF RIGHT ARTIFICIAL HIP JOINT: Chronic | ICD-10-CM

## 2020-06-25 LAB
ALBUMIN SERPL ELPH-MCNC: 3.9 G/DL — SIGNIFICANT CHANGE UP (ref 3.3–5.2)
ALP SERPL-CCNC: 45 U/L — SIGNIFICANT CHANGE UP (ref 40–120)
ALT FLD-CCNC: 20 U/L — SIGNIFICANT CHANGE UP
ANION GAP SERPL CALC-SCNC: 16 MMOL/L — SIGNIFICANT CHANGE UP (ref 5–17)
APTT BLD: 28.8 SEC — SIGNIFICANT CHANGE UP (ref 27.5–36.3)
AST SERPL-CCNC: 24 U/L — SIGNIFICANT CHANGE UP
BILIRUB SERPL-MCNC: 0.7 MG/DL — SIGNIFICANT CHANGE UP (ref 0.4–2)
BUN SERPL-MCNC: 8 MG/DL — SIGNIFICANT CHANGE UP (ref 8–20)
CALCIUM SERPL-MCNC: 9.6 MG/DL — SIGNIFICANT CHANGE UP (ref 8.6–10.2)
CHLORIDE SERPL-SCNC: 101 MMOL/L — SIGNIFICANT CHANGE UP (ref 98–107)
CO2 SERPL-SCNC: 23 MMOL/L — SIGNIFICANT CHANGE UP (ref 22–29)
CREAT SERPL-MCNC: 0.64 MG/DL — SIGNIFICANT CHANGE UP (ref 0.5–1.3)
D DIMER BLD IA.RAPID-MCNC: 1152 NG/ML DDU — HIGH
GLUCOSE SERPL-MCNC: 104 MG/DL — HIGH (ref 70–99)
HCT VFR BLD CALC: 32.7 % — LOW (ref 34.5–45)
HGB BLD-MCNC: 10.8 G/DL — LOW (ref 11.5–15.5)
INR BLD: 1.05 RATIO — SIGNIFICANT CHANGE UP (ref 0.88–1.16)
MAGNESIUM SERPL-MCNC: 1.9 MG/DL — SIGNIFICANT CHANGE UP (ref 1.8–2.6)
MCHC RBC-ENTMCNC: 29.5 PG — SIGNIFICANT CHANGE UP (ref 27–34)
MCHC RBC-ENTMCNC: 33 GM/DL — SIGNIFICANT CHANGE UP (ref 32–36)
MCV RBC AUTO: 89.3 FL — SIGNIFICANT CHANGE UP (ref 80–100)
NT-PROBNP SERPL-SCNC: 80 PG/ML — SIGNIFICANT CHANGE UP (ref 0–300)
PLATELET # BLD AUTO: 230 K/UL — SIGNIFICANT CHANGE UP (ref 150–400)
POTASSIUM SERPL-MCNC: 3.6 MMOL/L — SIGNIFICANT CHANGE UP (ref 3.5–5.3)
POTASSIUM SERPL-SCNC: 3.6 MMOL/L — SIGNIFICANT CHANGE UP (ref 3.5–5.3)
PROT SERPL-MCNC: 6.7 G/DL — SIGNIFICANT CHANGE UP (ref 6.6–8.7)
PROTHROM AB SERPL-ACNC: 11.9 SEC — SIGNIFICANT CHANGE UP (ref 10–12.9)
RBC # BLD: 3.66 M/UL — LOW (ref 3.8–5.2)
RBC # FLD: 13.4 % — SIGNIFICANT CHANGE UP (ref 10.3–14.5)
SODIUM SERPL-SCNC: 139 MMOL/L — SIGNIFICANT CHANGE UP (ref 135–145)
TROPONIN T SERPL-MCNC: <0.01 NG/ML — SIGNIFICANT CHANGE UP (ref 0–0.06)
TROPONIN T SERPL-MCNC: <0.01 NG/ML — SIGNIFICANT CHANGE UP (ref 0–0.06)
WBC # BLD: 7.37 K/UL — SIGNIFICANT CHANGE UP (ref 3.8–10.5)
WBC # FLD AUTO: 7.37 K/UL — SIGNIFICANT CHANGE UP (ref 3.8–10.5)

## 2020-06-25 PROCEDURE — 85379 FIBRIN DEGRADATION QUANT: CPT

## 2020-06-25 PROCEDURE — 84484 ASSAY OF TROPONIN QUANT: CPT

## 2020-06-25 PROCEDURE — 71275 CT ANGIOGRAPHY CHEST: CPT | Mod: 26

## 2020-06-25 PROCEDURE — 93010 ELECTROCARDIOGRAM REPORT: CPT | Mod: 76

## 2020-06-25 PROCEDURE — 71045 X-RAY EXAM CHEST 1 VIEW: CPT | Mod: 26

## 2020-06-25 PROCEDURE — 71045 X-RAY EXAM CHEST 1 VIEW: CPT

## 2020-06-25 PROCEDURE — 83735 ASSAY OF MAGNESIUM: CPT

## 2020-06-25 PROCEDURE — 85610 PROTHROMBIN TIME: CPT

## 2020-06-25 PROCEDURE — 93005 ELECTROCARDIOGRAM TRACING: CPT

## 2020-06-25 PROCEDURE — 99284 EMERGENCY DEPT VISIT MOD MDM: CPT | Mod: 25

## 2020-06-25 PROCEDURE — 99284 EMERGENCY DEPT VISIT MOD MDM: CPT

## 2020-06-25 PROCEDURE — 83880 ASSAY OF NATRIURETIC PEPTIDE: CPT

## 2020-06-25 PROCEDURE — 85730 THROMBOPLASTIN TIME PARTIAL: CPT

## 2020-06-25 PROCEDURE — 80053 COMPREHEN METABOLIC PANEL: CPT

## 2020-06-25 PROCEDURE — 71275 CT ANGIOGRAPHY CHEST: CPT

## 2020-06-25 PROCEDURE — 85027 COMPLETE CBC AUTOMATED: CPT

## 2020-06-25 PROCEDURE — 36415 COLL VENOUS BLD VENIPUNCTURE: CPT

## 2020-06-25 RX ORDER — SODIUM CHLORIDE 9 MG/ML
1000 INJECTION INTRAMUSCULAR; INTRAVENOUS; SUBCUTANEOUS ONCE
Refills: 0 | Status: COMPLETED | OUTPATIENT
Start: 2020-06-25 | End: 2020-06-25

## 2020-06-25 RX ORDER — SODIUM CHLORIDE 9 MG/ML
1000 INJECTION INTRAMUSCULAR; INTRAVENOUS; SUBCUTANEOUS ONCE
Refills: 0 | Status: DISCONTINUED | OUTPATIENT
Start: 2020-06-25 | End: 2020-06-30

## 2020-06-25 RX ADMIN — SODIUM CHLORIDE 1000 MILLILITER(S): 9 INJECTION INTRAMUSCULAR; INTRAVENOUS; SUBCUTANEOUS at 18:28

## 2020-06-25 NOTE — ED ADULT NURSE NOTE - OBJECTIVE STATEMENT
pt alert and oriented x4 came in c/o dizziness with palpitations. pt reports increased while walking, pt reports BP fluctuating between 90/60 and 160/80s. RR even unlabored. pt reports L hip replacement friday at [facility]. pt ambulatory with cane. pt educated on plan of care, pt able to successfully teach back plan of care to RN, RN will continue to reeducate pt during hospital stay.

## 2020-06-25 NOTE — ED ADULT TRIAGE NOTE - CHIEF COMPLAINT QUOTE
Pt c/o dizziness and palpitations that began yesterday morning after taking her blood pressure medications, drank water and began to feel better.  Same symptoms occurred today after taking blood pressure medications, pt checked her heart rate which she noted to be in the 120's so she called EMS.  Denies chest pain or dizziness at this time.  Pt noted she had recent left hip surgery at [facility] last Friday, has lost weight since and now concerned her BP meds need to be adjusted.

## 2020-06-25 NOTE — ED ADULT NURSE REASSESSMENT NOTE - NS ED NURSE REASSESS COMMENT FT1
Pt. safe for discharge as per provider. Vital signs stable and as charted. Pt. at baseline neuro status. Discussed patient specific discharge teaching with pt, pt verbalized understanding. All questions answered. IV catheter discontinued. Pt. discharged as per orders.
Repeat troponin and EKG obtained as per orders. Blood specimen sent to lab, EKG given to Dr. Caraballo. Awaiting troponin results at this time.
Handoff received from offgoing RN. Charting as noted. Pt. received alert and oriented x 4, returned from CTA Chest, resting in stretcher, in NAD. Vital signs stable and as charted. Pt. in NSR on cardiac monitor. Breathing spontaneous, unlabored, and symmetrical on room air. Skin warm, dry, normal for race. Fall Precautions maintained. Call bell within reach. Educated pt. on plan of care, pt verbalized understanding. Awaiting CTA results and disposition at this time. Pt. safety and comfort measures maintained.

## 2020-06-25 NOTE — ED PROVIDER NOTE - NS ED ROS FT
Constitutional: (-) fever  (-)chills  (-)sweats  Eyes/ENT: (-) blurry vision, (-) epistaxis  (-)rhinorrhea   (-) sore throat    Cardiovascular: (-) chest pain, (+) palpitations (-) edema   Respiratory: (-) cough, (-) shortness of breath   Gastrointestinal: (-)nausea  (-)vomiting, (-) diarrhea  (-) abdominal pain   :  (-)dysuria, (-)frequency, (-)urgency, (-)hematuria  Musculoskeletal: (-) neck pain, (-) back pain, (-) joint pain  Integumentary: (-) rash, (-) edema  Neurological: (-) headache, (-) altered mental status  (-)LOC  +near-syncope

## 2020-06-25 NOTE — CONSULT NOTE ADULT - SUBJECTIVE AND OBJECTIVE BOX
Formerly [facility], [facility]                                   [address] [place] [address]                                                      PHONE: [phone]                                                         FAX: [phone]  [email or web address]  ---------------------------------------------------------------------------------------------------------------------------------    HPI:  BROCK ANDRADE is an 69y Female with HTn and HLD with hip replacement last week.  She was feeling well but hadn't been eating much.  Shelost 8 pounds since the surgery.  She had been taking 650mg asa bid but couldnt tolerate it due to GERD and gastritis.    Today, she took both HTN meds on an empty stomach.  She found her BP was low and she was lightheaded.  She was also tachycardic while walking around.     No fevers, chills, nausea, vomiting, +loose stools     PAST MEDICAL & SURGICAL HISTORY:  HLD (hyperlipidemia)  HTN (hypertension)  History of total right hip replacement      Tapazole (Rash)  tetracycline (Rash)      MEDICATIONS  (STANDING):  sodium chloride 0.9% Bolus 1000 milliLiter(s) IV Bolus once    MEDICATIONS  (PRN):      Family History: Pt denies hx of early cad, SCD, or congenital heart disease.      Social History:  Cigarettes:   former, quit [id number]                 Alchohol:   no              Illicit Drug Abuse:  no    ROS:    Extensively Reviewed with pertinents as per HPI the remainder were negative.      Vital Signs Last 24 Hrs  T(C): 36.8 (25 Jun 2020 16:01), Max: 36.8 (25 Jun 2020 16:01)  T(F): 98.3 (25 Jun 2020 16:01), Max: 98.3 (25 Jun 2020 16:01)  HR: 79 (25 Jun 2020 19:29) (79 - 96)  BP: 133/69 (25 Jun 2020 19:29) (113/73 - 133/69)  BP(mean): --  RR: 18 (25 Jun 2020 19:29) (17 - 18)  SpO2: 97% (25 Jun 2020 19:29) (95% - 97%)  ICU Vital Signs Last 24 Hrs  BROCK ANDRADE  I&O's Detail    I&O's Summary    Drug Dosing Weight  BROCK ANDRADE      PHYSICAL EXAM:  General: Appears well developed, well nourished alert and cooperative.  HEENT: Head; normocephalic, atraumatic.  Eyes: Pupils reactive, cornea wnl.  Neck: Supple, no nodes adenopathy, no NVD or carotid bruit or thyromegaly.  CARDIOVASCULAR: Normal S1 and S2, No murmur, rub, gallop or lift.   LUNGS: No rales, rhonchi or wheeze. Normal breath sounds bilaterally.  ABDOMEN: Soft, nontender without mass or organomegaly. bowel sounds normoactive.  EXTREMITIES: No clubbing, cyanosis. Distal pulses wnl.  Trace LLE edema  SKIN: warm and dry with normal turgor.  NEURO: Alert/oriented x 3/normal motor exam. No pathologic reflexes.    PSYCH: normal affect.        LABS:                        10.8   7.37  )-----------( 230      ( 25 Jun 2020 17:18 )             32.7     06-25    139  |  101  |  8.0  ----------------------------<  104<H>  3.6   |  23.0  |  0.64    Ca    9.6      25 Jun 2020 17:18  Mg     1.9     06-25    TPro  6.7  /  Alb  3.9  /  TBili  0.7  /  DBili  x   /  AST  24  /  ALT  20  /  AlkPhos  45  06-25    BROCK ANDRADE  CARDIAC MARKERS ( 25 Jun 2020 17:18 )  x     / <0.01 ng/mL / x     / x     / x          PT/INR - ( 25 Jun 2020 17:18 )   PT: 11.9 sec;   INR: 1.05 ratio         PTT - ( 25 Jun 2020 17:18 )  PTT:28.8 sec      RADIOLOGY & ADDITIONAL STUDIES:    INTERPRETATION OF TELEMETRY (personally reviewed):    ECG: NSR, normal axis, low voltage, no ischemia    ECHO:    STRESS TEST:    CARDIAC CATHETERIZATION:    Assessment and Plan:  In summary, BROCK ANDRADE is an 69y Female with past medical history significant for HTN, HLD with hip replacement last week presents with chest burning and tachycardia with low bp.    Low blood pressure- CXR normal.  Would check UA.  Can be from taking both meds on an empty stomach.  Would take after meals.  BP normal here.  Chest Ct negative for PE>     Tachycardia- normal EKG.  Tele review shows normal sinus in 80s.  If recurs at home, would suggest ambulatory monitor.    Gastritis-patient cannot tolerate the ASA. She will contact S to see if she should take DOAC instead    HLD- continue statin    HTN- take meds on full stomach.  Can split and take one in morning and the other at night.                Thank you for allowing [facility] to participate in the care of this patient.  Please feel free to call with any questions or concerns.

## 2020-06-25 NOTE — ED PROVIDER NOTE - PATIENT PORTAL LINK FT
You can access the FollowMyHealth Patient Portal offered by [facility] by registering at the following website: [email or web address]. By joining [employer]’s FollowMyHealth portal, you will also be able to view your health information using other applications (apps) compatible with our system.

## 2020-06-25 NOTE — ED PROVIDER NOTE - NSFOLLOWUPINSTRUCTIONS_ED_ALL_ED_FT
Near-Syncope  Near-syncope is when you suddenly get weak or dizzy, or you feel like you might pass out (faint). This may also be called presyncope. This is due to a lack of blood flow to the brain. During an episode of near-syncope, you may:  Feel dizzy, weak, or light-headed.Feel sick to your stomach (nauseous).See all white or all black.See spots.Have cold, clammy skin.This condition is caused by a sudden decrease in blood flow to the brain. This decrease can result from various causes, but most of those causes are not dangerous. However, near-syncope may be a sign of a serious medical problem, so it is important to seek medical care.  Follow these instructions at home:  Medicines     Take over-the-counter and prescription medicines only as told by your doctor.If you are taking blood pressure or heart medicine, get up slowly and spend many minutes getting ready to sit and then stand. This can help with dizziness.General instructions     Be aware of any changes in your symptoms.Talk with your doctor about your symptoms. You may need to have testing to find the cause of your near-syncope.If you start to feel like you might pass out, lie down right away. Raise (elevate) your feet above the level of your heart. Breathe deeply and steadily. Wait until all of the symptoms are gone.Have someone stay with you until you feel stable.Do not drive, use machinery, or play sports until your doctor says it is okay.Drink enough fluid to keep your pee (urine) pale yellow.Keep all follow-up visits as told by your doctor. This is important.Get help right away if you:  Have a seizure.Have pain in your:  Chest.Belly (abdomen).Back.Faint once or more than once.Have a very bad headache.Are bleeding from your mouth or butt.Have black or tarry poop (stool).Have a very fast or uneven heartbeat (palpitations).Are mixed up (confused).Have trouble walking.Are very weak.Have trouble seeing.These symptoms may be an emergency. Do not wait to see if the symptoms will go away. Get medical help right away. Call your local emergency services (911 in the [place] Do not drive yourself to the hospital.   Summary  Near-syncope is when you suddenly get weak or dizzy, or you feel like you might pass out (faint).This condition is caused by a lack of blood flow to the brain.Near-syncope may be a sign of a serious medical problem, so it is important to seek medical care.

## 2020-06-25 NOTE — ED ADULT NURSE NOTE - CHIEF COMPLAINT QUOTE
Pt c/o dizziness and palpitations that began yesterday morning after taking her blood pressure medications, drank water and began to feel better.  Same symptoms occurred today after taking blood pressure medications, pt checked her heart rate which she noted to be in the 120's so she called EMS.  Denies chest pain or dizziness at this time.  Pt noted she had recent left hip surgery at [facility] last Friday, has lost weight since and now concerned her BP meds need to be adjusted.

## 2020-06-25 NOTE — ED PROVIDER NOTE - CLINICAL SUMMARY MEDICAL DECISION MAKING FREE TEXT BOX
patient arrived with lightheadedness, tachycardia and transient hypotension which have now resolved. CTA shows no PE. 2 sets of enzymes negative.  patient notified regarding H/H level and noted to follow up.

## 2020-06-25 NOTE — ED PROVIDER NOTE - OBJECTIVE STATEMENT
69yoF; with pmh signif for HTN, HLD; now p/w near-syncope sensation and palpitations. patient states she is s/p left hip replacement 1 week ago and was sent home on aspirin as prophylaxis.  patient states yesterday, she awoke with lightheadedness, worse with ambulation.  reports lightheadedness associated with tachycardia in 120s and hypotension to 90s/60s.  denies loc. denies headache. denies cp/sob/palp. denies f/c/s. denies cough.  denies easy bruising or bleeding.  PMH: HTN, HLD  SOCIAL: No tobacco/illicit substance use/socialEtOH

## 2020-07-03 NOTE — ED ADULT NURSE NOTE - CCCP TRG CHIEF CMPLNT
dizziness/palpitations Scribe Attestation (For Scribes USE Only)... Scribe Attestation (For Scribes USE Only).../Attending Attestation (For Attendings USE Only)...

## 2022-02-10 NOTE — ED ADULT TRIAGE NOTE - MEANS OF ARRIVAL
Show Skinceuticals Line: Yes Action 3: Continue Detail Level: Zone ambulatory Initiate Regimen: Betamethasone 0.05% cream BID \\nOTC Zyrtec QAM Discontinue Regimen: Triamcinolone

## 2022-11-25 NOTE — ED PROVIDER NOTE - NS ED SCRIBE STATEMENT
----- Message from Joni Hastings PA-C sent at 11/25/2022  2:34 PM CST -----  The covid and influenza testing is negative.  
Pt's mother called by writer and message left that [facility] Covid and influenza's results are negative. Clinic phone number left to call back with any questions.   
Attending

## 2023-02-28 NOTE — ED PROVIDER NOTE - EYES NEGATIVE STATEMENT, MLM
no discharge, no irritation, no pain, no redness, and no visual changes. Time-based billing (NON-critical care)

## 2023-07-31 NOTE — ED ADULT NURSE NOTE - NSFALLRSKASSESASSIST_ED_ALL_ED
Pt called needing extension of her synthroid 112MCG. Cancelled her appt on 07/18/23, extension was already given. Please advise.    Kristal Nicole is requesting a refill of:    Pending Prescriptions:                       Disp   Refills    SYNTHROID 112 MCG tablet                  18 tab*0            Sig: Take 1 tablet (112 mcg) by mouth daily       no